# Patient Record
Sex: MALE | URBAN - METROPOLITAN AREA
[De-identification: names, ages, dates, MRNs, and addresses within clinical notes are randomized per-mention and may not be internally consistent; named-entity substitution may affect disease eponyms.]

---

## 2022-12-05 ENCOUNTER — OFFICE VISIT (OUTPATIENT)
Dept: VASCULAR SURGERY | Age: 32
End: 2022-12-05
Payer: COMMERCIAL

## 2022-12-05 VITALS
BODY MASS INDEX: 42.66 KG/M2 | DIASTOLIC BLOOD PRESSURE: 85 MMHG | HEART RATE: 86 BPM | OXYGEN SATURATION: 96 % | HEIGHT: 72 IN | WEIGHT: 315 LBS | SYSTOLIC BLOOD PRESSURE: 120 MMHG

## 2022-12-05 DIAGNOSIS — I87.2 VENOUS STASIS ULCER OF LEFT ANKLE LIMITED TO BREAKDOWN OF SKIN WITHOUT VARICOSE VEINS (HCC): Primary | ICD-10-CM

## 2022-12-05 DIAGNOSIS — I87.2 VENOUS (PERIPHERAL) INSUFFICIENCY: ICD-10-CM

## 2022-12-05 DIAGNOSIS — L97.321 VENOUS STASIS ULCER OF LEFT ANKLE LIMITED TO BREAKDOWN OF SKIN WITHOUT VARICOSE VEINS (HCC): Primary | ICD-10-CM

## 2022-12-05 DIAGNOSIS — I89.0 LYMPHEDEMA: ICD-10-CM

## 2022-12-05 PROCEDURE — 99204 OFFICE O/P NEW MOD 45 MIN: CPT | Performed by: PHYSICIAN ASSISTANT

## 2022-12-05 NOTE — PROGRESS NOTES
1. Have you been to the ER, urgent care clinic since your last visit? Yes  Hospitalized since your last visit? [facility] leg swelling     2. Have you seen or consulted any other health care providers outside of the [address] [place] [address] since your last visit? Include any pap smears or colon screening.  No

## 2022-12-06 NOTE — PROGRESS NOTES
Chief Complaint   Patient presents with    Leg Pain     Referred by [facility]          Impression:  28 y.o. male with bilateral lower extremity venous insufficiency, with left lower extremity venous stasis ulcer, and bilateral lower extremity lymphedema with abdominal component. History and Physical    Gaviota Taylor is a pleasant 28y.o. year old male who presents today for evaluation for his known chronic lower extremity swelling and fatigue tired legs. Patient states that he has developed an ulcer on the inside of his left ankle because of the persistent swelling. Patient states that the ulcer is small, dime size, and has had clear drainage over the past couple weeks. Denies any surrounding redness or tenderness to the area. He denies any fever or chills. States that he can ambulate daily about 3-4 blocks without any leg pain or discomfort but by the end of the day his legs are extremely heavy fatigued and tired. Reports that he has had varicosities since he was a young boy, states that over the past couple months he has noted that his varicosities have gotten a little more church and slightly tender, right leg greater than left. He states he is on no daily medicines at this time. Denies taking a daily aspirin or statin medication. States overall he feels he is healthy other than his lower extremity edema, presents today hoping that he can get some relief so that he can walk better. Patient denies any history of healing issues with his lower extremities in the past.    History reviewed. No pertinent past medical history. There is no problem list on file for this patient. History reviewed. No pertinent surgical history.     Allergies   Allergen Reactions    Penicillin Hives     Social History     Socioeconomic History    Marital status: UNKNOWN     Spouse name: Not on file    Number of children: Not on file    Years of education: Not on file    Highest education level: Not on file Occupational History    Not on file   Tobacco Use    Smoking status: Every Day     Packs/day: 0.50     Types: Cigarettes    Smokeless tobacco: Not on file   Substance and Sexual Activity    Alcohol use: Not on file    Drug use: Not on file    Sexual activity: Not on file   Other Topics Concern    Not on file   Social History Narrative    Not on file     Social Determinants of Health     Financial Resource Strain: Not on file   Food Insecurity: Not on file   Transportation Needs: Not on file   Physical Activity: Not on file   Stress: Not on file   Social Connections: Not on file   Intimate Partner Violence: Not on file   Housing Stability: Not on file      History reviewed. No pertinent family history. Review of Systems    General: negative for fever chills. Ambulates daily 3-4 blocks without any leg pain or discomfort but by the end the day his legs are extremely heavy fatigued and tired. Eyes: negative for vision loss   HENT: negative for cold symptoms   Respiratory negative for shortness of breath   Cardiac: negative for chest pain   Vascular negative for foot pain at night    Gastrointestinal: negative for abdominal pain   Genitourinary: negative for dysuria    Endocrine: negative for excessive thirst   Skin: Deferred dime sized ulcerated in his left medial ankle area, no drainage or surrounding erythema noted   Neurological: negative for paralysis   Psychiatric: negative for depression          Physical Exam:    Visit Vitals  /85   Pulse 86   Ht 6' (1.829 m)   Wt (!) 380 lb (172.4 kg)   SpO2 96%   BMI 51.54 kg/m²      Constitutional:  Patient is well developed, well nourished, and not distressed. Ambulated into the room albeit slowly but with a normal steady gait. HEENT: atraumatic, normocephalic, wearing a mask. Eyes:   Cunjunctivae clear, no scleral icterus  Neck:   No JVD present. Cardiovascular:  Normal rate, regular rhythm, normal heart sounds. No murmur heard.   Pulmonary/Chest: Effort normal .  Extremities: Normal range of motion. Positive bilateral pedal edema 1+. Left greater than right. Small dime size ulcerative area noted, to the left lower extremity, with only skin breakage noted, no surrounding erythema no purulent drainage. Chronic hemosiderin staining noted bilaterally. No calf tenderness to palpation bilaterally. Neurovascularly intact to both soft and deep sensation bilaterally. Albeit slightly diminished due to edema palpable pedal pulses noted bilaterally. Neurological:  he  is alert and oriented x3 . Gait normal. Motor & sensory grossly intact in all 4 limbs. Psych: Appropriate mood and affect. Skin:  Skin is warm and dry. No ulcerations  Pulses:  Femoral-palpable bilaterally                Popliteal-palpable bilaterally                PT/DP -albeit diminished palpable bilaterally, triphasic Doppler signal noted  Carotid: No carotid bruits or thrills appreciated bilaterally. Plan:  Patient instructed on the difference between arterial and venous insufficiency. Given the fact the patient has palpable pedal pulses and no signs of claudication we will continue with venous insufficiency work-up. Patient instructed on the importance of compression elevation in the face of venous insufficiency. Patient educated on proper elevation, elevation above the level of his heart. Patient instructed he must lay flat with 2-3 pillows under his legs to get his legs 20 to 30 inches above the level of his heart for proper elevation. Patient agreeable to try. Long discussion with patient about compression therapy. Patient given a prescription for compression stockings 20 to 30 mmHg and compliance discussed at length.   Being that the patient has a small ulcerated area on his left ankle we will refrain from compression therapy at this visit we placed the patient with a aqua seal dressing, wrap the incisional area with Kerlix, and applied Tubi compression gauze to both lower extremities. Educated on local wound care, patient to continue with daily dressing changes with aqua seal and 4 x 4's and Kerlix, patient instructed on proper treatment and states that he is willing to proceed as planned. Patient states that he feels much better with the Tubi compression stockings on. Brief discussion held with patient about venous reflux disease and ablation therapy for such disease. Patient instructed if he was to be compliant with compression elevation I will bring him back in 3 months and perform bilateral lower extremity venous reflux imaging. Patient states he understands more than willing to proceed as planned. Patient to follow-up sooner if he has any worsening of his ulcerative area, in the meantime he is to continue with local treatment and compression elevation. Brief discussion held with patient about lymphedema in the face of venous insufficiency. Patient instructed that once he gets his ulceration healed up and get his legs with less edema we will discuss lymphedema compression treatment as a modality in the future. Patient agreeable. In the meantime patient encouraged to make better lifestyle choices, better nutritional choices, weight loss, increase his daily activity as tolerated, being compliant with compression elevation, and of course smoking cessation. Patient states he understands above, is more than willing to be compliant with above recommendations, and is willing to proceed as planned. I will discuss details with my attending. The treatment plan was reviewed with the patient in detail. The patient voiced understanding of this plan and all questions and concerns were addressed. The patient agrees with this plan. We discussed the signs and symptoms that would require earlier attention or intervention. I appreciate the opportunity to participate in the care of your patient.   I will be sure to keep you informed of any subsequent changes in the treatment plan.  If you have any questions or concerns, please feel free to contact me.       iPotr Burleson PA-C  Vascular Physician Assistant  [phone]

## 2023-02-08 DIAGNOSIS — I87.2 VENOUS INSUFFICIENCY (CHRONIC) (PERIPHERAL): Primary | ICD-10-CM

## 2023-03-06 DIAGNOSIS — I87.2 VENOUS (PERIPHERAL) INSUFFICIENCY: ICD-10-CM

## 2023-03-06 DIAGNOSIS — L97.321 VENOUS STASIS ULCER OF LEFT ANKLE LIMITED TO BREAKDOWN OF SKIN WITHOUT VARICOSE VEINS (HCC): ICD-10-CM

## 2023-03-06 DIAGNOSIS — I87.2 VENOUS STASIS ULCER OF LEFT ANKLE LIMITED TO BREAKDOWN OF SKIN WITHOUT VARICOSE VEINS (HCC): ICD-10-CM

## 2023-03-06 DIAGNOSIS — I89.0 LYMPHEDEMA: ICD-10-CM

## 2023-03-19 NOTE — PROGRESS NOTES
superficial venous insufficiency. Interpretation Summary         No evidence of deep vein thrombosis in the left lower extremity. Grossly patent tibial and peroneal veins with color and doppler, cannot exclude non occlusive thrombus. Technically difficult exam secondary to edema. Deep venous insufficiency (>1.0 sec) noted in the left common femoral vein. Superficial venous insufficiency (>0.5 sec) noted in the left great saphenous vein. Incompetent  vein in the mid to distal calf with multiple varices associated. The left SSV is patent without reflux. Non vascular mass in the left groin measuring 2.9 x 1.5cm trv consistent with a lymph node. Procedure Details    A gray scale, color Doppler imaging and spectral Doppler analysis ultrasound was performed. During the study longitudinal and transverse views were obtained. Pulsed wave doppler was performed. Overall the study quality was technically difficult. Study was technically difficult due to: body habitus and diffuse subcutaneous edema. Lower Extremity Venous Findings    Right Lower Venous    For comparison purposes, the right common femoral vein was briefly interrogated. The vein demonstrates normal color filling and compressibility. Doppler flow was phasic and spontaneous. Left Lower Venous    No evidence of deep vein thrombosis. The common femoral, saphenofemoral junction, profunda, femoral, popliteal, posterior tibial, and peroneal veins were all imaged in the transverse view with normal compressibility and in the sagittal view with spontaneous, phasic flow, and normal color filling. Posterior Tibial Vein: Grossly patent (cannot exclude non-occlusive thrombus). Peroneal Vein: Grossly patent (cannot exclude non-occlusive thrombus). Posterior tibial artery Doppler waveforms are multiphasic. Reflux study patient position: supine. Deep venous insufficiency (>1.0 sec) noted in the common femoral vein.

## 2023-03-20 ENCOUNTER — OFFICE VISIT (OUTPATIENT)
Age: 33
End: 2023-03-20
Payer: COMMERCIAL

## 2023-03-20 VITALS
HEIGHT: 72 IN | WEIGHT: 315 LBS | BODY MASS INDEX: 42.66 KG/M2 | DIASTOLIC BLOOD PRESSURE: 70 MMHG | SYSTOLIC BLOOD PRESSURE: 130 MMHG | OXYGEN SATURATION: 96 % | HEART RATE: 82 BPM

## 2023-03-20 DIAGNOSIS — I89.0 LYMPHEDEMA: ICD-10-CM

## 2023-03-20 DIAGNOSIS — I83.893 VARICOSE VEINS OF BOTH LEGS WITH EDEMA: ICD-10-CM

## 2023-03-20 DIAGNOSIS — E66.01 MORBID OBESITY WITH BODY MASS INDEX OF 50.0-59.9 IN ADULT (HCC): Primary | ICD-10-CM

## 2023-03-20 PROCEDURE — 99214 OFFICE O/P EST MOD 30 MIN: CPT | Performed by: SURGERY

## 2023-05-03 ENCOUNTER — OFFICE VISIT (OUTPATIENT)
Age: 33
End: 2023-05-03
Payer: COMMERCIAL

## 2023-05-03 VITALS
SYSTOLIC BLOOD PRESSURE: 132 MMHG | DIASTOLIC BLOOD PRESSURE: 80 MMHG | BODY MASS INDEX: 42.66 KG/M2 | WEIGHT: 315 LBS | HEART RATE: 77 BPM | OXYGEN SATURATION: 97 % | HEIGHT: 72 IN

## 2023-05-03 DIAGNOSIS — I83.893 VARICOSE VEINS OF BOTH LEGS WITH EDEMA: Primary | ICD-10-CM

## 2023-05-03 DIAGNOSIS — I89.0 LYMPHEDEMA: ICD-10-CM

## 2023-05-03 DIAGNOSIS — E66.01 MORBID OBESITY WITH BODY MASS INDEX OF 50.0-59.9 IN ADULT (HCC): Primary | ICD-10-CM

## 2023-05-03 PROCEDURE — 99213 OFFICE O/P EST LOW 20 MIN: CPT | Performed by: SURGERY

## 2023-05-03 RX ORDER — NAPROXEN 500 MG/1
500 TABLET ORAL 2 TIMES DAILY
COMMUNITY
Start: 2023-04-25

## 2023-05-03 ASSESSMENT — PATIENT HEALTH QUESTIONNAIRE - PHQ9
SUM OF ALL RESPONSES TO PHQ QUESTIONS 1-9: 0
2. FEELING DOWN, DEPRESSED OR HOPELESS: 0
SUM OF ALL RESPONSES TO PHQ9 QUESTIONS 1 & 2: 0
SUM OF ALL RESPONSES TO PHQ QUESTIONS 1-9: 0
SUM OF ALL RESPONSES TO PHQ QUESTIONS 1-9: 0
1. LITTLE INTEREST OR PLEASURE IN DOING THINGS: 0
SUM OF ALL RESPONSES TO PHQ QUESTIONS 1-9: 0
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
## 2023-05-03 NOTE — PROGRESS NOTES
1. Have you been to the ER, urgent care clinic since your last visit? Yes/ [facility]/ april     Hospitalized since your last visit? No     2. Have you seen or consulted any other health care providers outside of the [address] [place] [address] since your last visit? Include any pap smears or colon screening.   No

## 2023-07-10 ENCOUNTER — OFFICE VISIT (OUTPATIENT)
Facility: CLINIC | Age: 33
End: 2023-07-10
Payer: COMMERCIAL

## 2023-07-10 ENCOUNTER — HOSPITAL ENCOUNTER (OUTPATIENT)
Facility: HOSPITAL | Age: 33
Setting detail: SPECIMEN
Discharge: HOME | End: 2023-07-13
Payer: COMMERCIAL

## 2023-07-10 VITALS
HEIGHT: 72 IN | HEART RATE: 71 BPM | DIASTOLIC BLOOD PRESSURE: 72 MMHG | BODY MASS INDEX: 42.66 KG/M2 | OXYGEN SATURATION: 94 % | TEMPERATURE: 98.5 F | RESPIRATION RATE: 16 BRPM | WEIGHT: 315 LBS | SYSTOLIC BLOOD PRESSURE: 142 MMHG

## 2023-07-10 DIAGNOSIS — E66.01 CLASS 3 SEVERE OBESITY DUE TO EXCESS CALORIES WITH SERIOUS COMORBIDITY AND BODY MASS INDEX (BMI) OF 50.0 TO 59.9 IN ADULT (HCC): ICD-10-CM

## 2023-07-10 DIAGNOSIS — I10 ESSENTIAL HYPERTENSION: ICD-10-CM

## 2023-07-10 DIAGNOSIS — G47.9 DISORDERED SLEEP: Primary | ICD-10-CM

## 2023-07-10 DIAGNOSIS — L73.2 HIDRADENITIS SUPPURATIVA: ICD-10-CM

## 2023-07-10 DIAGNOSIS — I89.0 LYMPHEDEMA: ICD-10-CM

## 2023-07-10 LAB
ALBUMIN SERPL-MCNC: 3.3 G/DL (ref 3.4–5)
ALBUMIN/GLOB SERPL: 0.9 (ref 0.8–1.7)
ALP SERPL-CCNC: 131 U/L (ref 45–117)
ALT SERPL-CCNC: 51 U/L (ref 16–61)
ANION GAP SERPL CALC-SCNC: 2 MMOL/L (ref 3–18)
AST SERPL-CCNC: 17 U/L (ref 10–38)
BILIRUB SERPL-MCNC: 0.3 MG/DL (ref 0.2–1)
BUN SERPL-MCNC: 9 MG/DL (ref 7–18)
BUN/CREAT SERPL: 12 (ref 12–20)
CALCIUM SERPL-MCNC: 8.8 MG/DL (ref 8.5–10.1)
CHLORIDE SERPL-SCNC: 109 MMOL/L (ref 100–111)
CHOLEST SERPL-MCNC: 175 MG/DL
CO2 SERPL-SCNC: 29 MMOL/L (ref 21–32)
CREAT SERPL-MCNC: 0.74 MG/DL (ref 0.6–1.3)
ERYTHROCYTE [DISTWIDTH] IN BLOOD BY AUTOMATED COUNT: 14.3 % (ref 11.6–14.5)
EST. AVERAGE GLUCOSE BLD GHB EST-MCNC: 143 MG/DL
GLOBULIN SER CALC-MCNC: 3.6 G/DL (ref 2–4)
GLUCOSE SERPL-MCNC: 138 MG/DL (ref 74–99)
HBA1C MFR BLD: 6.6 % (ref 4.2–5.6)
HCT VFR BLD AUTO: 44.9 % (ref 36–48)
HDLC SERPL-MCNC: 25 MG/DL (ref 40–60)
HDLC SERPL: 7 (ref 0–5)
HGB BLD-MCNC: 14.4 G/DL (ref 13–16)
LDLC SERPL CALC-MCNC: 73.6 MG/DL (ref 0–100)
LIPID PANEL: ABNORMAL
MCH RBC QN AUTO: 29.2 PG (ref 24–34)
MCHC RBC AUTO-ENTMCNC: 32.1 G/DL (ref 31–37)
MCV RBC AUTO: 91.1 FL (ref 78–100)
NRBC # BLD: 0 K/UL (ref 0–0.01)
NRBC BLD-RTO: 0 PER 100 WBC
PLATELET # BLD AUTO: 364 K/UL (ref 135–420)
PMV BLD AUTO: 11 FL (ref 9.2–11.8)
POTASSIUM SERPL-SCNC: 4.4 MMOL/L (ref 3.5–5.5)
PROT SERPL-MCNC: 6.9 G/DL (ref 6.4–8.2)
RBC # BLD AUTO: 4.93 M/UL (ref 4.35–5.65)
SODIUM SERPL-SCNC: 140 MMOL/L (ref 136–145)
TRIGL SERPL-MCNC: 382 MG/DL
VLDLC SERPL CALC-MCNC: 76.4 MG/DL
WBC # BLD AUTO: 11.6 K/UL (ref 4.6–13.2)

## 2023-07-10 PROCEDURE — 99204 OFFICE O/P NEW MOD 45 MIN: CPT | Performed by: STUDENT IN AN ORGANIZED HEALTH CARE EDUCATION/TRAINING PROGRAM

## 2023-07-10 PROCEDURE — 3078F DIAST BP <80 MM HG: CPT | Performed by: STUDENT IN AN ORGANIZED HEALTH CARE EDUCATION/TRAINING PROGRAM

## 2023-07-10 PROCEDURE — 3077F SYST BP >= 140 MM HG: CPT | Performed by: STUDENT IN AN ORGANIZED HEALTH CARE EDUCATION/TRAINING PROGRAM

## 2023-07-10 PROCEDURE — 83036 HEMOGLOBIN GLYCOSYLATED A1C: CPT

## 2023-07-10 PROCEDURE — 80053 COMPREHEN METABOLIC PANEL: CPT

## 2023-07-10 PROCEDURE — 80061 LIPID PANEL: CPT

## 2023-07-10 PROCEDURE — 36415 COLL VENOUS BLD VENIPUNCTURE: CPT

## 2023-07-10 PROCEDURE — 85027 COMPLETE CBC AUTOMATED: CPT

## 2023-07-10 RX ORDER — LISINOPRIL 20 MG/1
20 TABLET ORAL DAILY
Qty: 90 TABLET | Refills: 1 | Status: SHIPPED | OUTPATIENT
Start: 2023-07-10

## 2023-07-10 SDOH — ECONOMIC STABILITY: FOOD INSECURITY: WITHIN THE PAST 12 MONTHS, THE FOOD YOU BOUGHT JUST DIDN'T LAST AND YOU DIDN'T HAVE MONEY TO GET MORE.: NEVER TRUE

## 2023-07-10 SDOH — ECONOMIC STABILITY: HOUSING INSECURITY
IN THE LAST 12 MONTHS, WAS THERE A TIME WHEN YOU DID NOT HAVE A STEADY PLACE TO SLEEP OR SLEPT IN A SHELTER (INCLUDING NOW)?: NO

## 2023-07-10 SDOH — ECONOMIC STABILITY: INCOME INSECURITY: HOW HARD IS IT FOR YOU TO PAY FOR THE VERY BASICS LIKE FOOD, HOUSING, MEDICAL CARE, AND HEATING?: SOMEWHAT HARD

## 2023-07-10 SDOH — ECONOMIC STABILITY: FOOD INSECURITY: WITHIN THE PAST 12 MONTHS, YOU WORRIED THAT YOUR FOOD WOULD RUN OUT BEFORE YOU GOT MONEY TO BUY MORE.: SOMETIMES TRUE

## 2023-07-10 ASSESSMENT — PATIENT HEALTH QUESTIONNAIRE - PHQ9
SUM OF ALL RESPONSES TO PHQ QUESTIONS 1-9: 13
1. LITTLE INTEREST OR PLEASURE IN DOING THINGS: 1
10. IF YOU CHECKED OFF ANY PROBLEMS, HOW DIFFICULT HAVE THESE PROBLEMS MADE IT FOR YOU TO DO YOUR WORK, TAKE CARE OF THINGS AT HOME, OR GET ALONG WITH OTHER PEOPLE: 1
SUM OF ALL RESPONSES TO PHQ9 QUESTIONS 1 & 2: 2
8. MOVING OR SPEAKING SO SLOWLY THAT OTHER PEOPLE COULD HAVE NOTICED. OR THE OPPOSITE, BEING SO FIGETY OR RESTLESS THAT YOU HAVE BEEN MOVING AROUND A LOT MORE THAN USUAL: 1
SUM OF ALL RESPONSES TO PHQ QUESTIONS 1-9: 13
SUM OF ALL RESPONSES TO PHQ QUESTIONS 1-9: 13
3. TROUBLE FALLING OR STAYING ASLEEP: 2
2. FEELING DOWN, DEPRESSED OR HOPELESS: 1
7. TROUBLE CONCENTRATING ON THINGS, SUCH AS READING THE NEWSPAPER OR WATCHING TELEVISION: 1
5. POOR APPETITE OR OVEREATING: 2
9. THOUGHTS THAT YOU WOULD BE BETTER OFF DEAD, OR OF HURTING YOURSELF: 0
4. FEELING TIRED OR HAVING LITTLE ENERGY: 2
SUM OF ALL RESPONSES TO PHQ QUESTIONS 1-9: 13
6. FEELING BAD ABOUT YOURSELF - OR THAT YOU ARE A FAILURE OR HAVE LET YOURSELF OR YOUR FAMILY DOWN: 3

## 2023-07-10 NOTE — PROGRESS NOTES
Alexander Hoang (: 1990) is a 28 y.o. male, new patient, here for evaluation of the following chief complaint(s):  Establish Care (Sleep apnea, referral to a podiatrist/)       Assessment/Plan:  1. Disordered sleep-witnessed apneic episodes, daytime sleepiness, loud snoring. Referral to sleep medicine for sleep study. Suspect blood pressure will improve with treatment of sleep apnea. -     [address], [address], , Sleep Medicine, [facility])  2. Essential hypertension-longstanding elevation, likely over the last 10 years. We will start lisinopril 20 mg daily. Recommend ambulatory blood pressure checks as able. -     lisinopril (PRINIVIL;ZESTRIL) 20 MG tablet; Take 1 tablet by mouth daily, Disp-90 tablet, R-1Normal  3. Class 3 severe obesity due to excess calories with serious comorbidity and body mass index (BMI) of 50.0 to 59.9 in adult (HCC)-diet and lifestyle modifications for weight loss. Dietary principles from MyPlate.gov for a healthy, varied diet. Reduce caloric intake. Recommend cumulatively 150-minutes of moderate intensity exercise or 75-minutes of vigorous activity weekly.  -     [facility] [address], , Sleep Medicine, [facility])  -     Lipid Panel; Future  -     Comprehensive Metabolic Panel; Future  -     CBC; Future  -     Hemoglobin A1C; Future  4. Lymphedema-continue follow-up with vascular surgery. Lymphedema pump further recommendations. Referral to podiatry for further foot care. -     External Referral To Podiatry  5. Hidradenitis suppurativa-discussed the importance of tobacco cessation and weight loss for the treatment of this condition. Can treat lesions as needed for the time being. Return in about 2 months (around 9/10/2023) for blood pressure, tobacco and weight. Subjective/Objective:  HPI  Establish Care- Recent move from [place]. Wife family is in the [place] area.  Currently working at [employer], previously a [employer]
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
## 2023-07-24 ENCOUNTER — OFFICE VISIT (OUTPATIENT)
Facility: CLINIC | Age: 33
End: 2023-07-24
Payer: COMMERCIAL

## 2023-07-24 VITALS
OXYGEN SATURATION: 97 % | BODY MASS INDEX: 50.86 KG/M2 | DIASTOLIC BLOOD PRESSURE: 82 MMHG | TEMPERATURE: 97.9 F | WEIGHT: 315 LBS | SYSTOLIC BLOOD PRESSURE: 132 MMHG | RESPIRATION RATE: 16 BRPM | HEART RATE: 79 BPM

## 2023-07-24 DIAGNOSIS — E66.01 CLASS 3 SEVERE OBESITY DUE TO EXCESS CALORIES WITH SERIOUS COMORBIDITY AND BODY MASS INDEX (BMI) OF 50.0 TO 59.9 IN ADULT (HCC): ICD-10-CM

## 2023-07-24 DIAGNOSIS — F17.200 TOBACCO USE DISORDER: ICD-10-CM

## 2023-07-24 DIAGNOSIS — E11.9 TYPE 2 DIABETES MELLITUS WITHOUT COMPLICATION, WITHOUT LONG-TERM CURRENT USE OF INSULIN (HCC): Primary | ICD-10-CM

## 2023-07-24 PROCEDURE — 3079F DIAST BP 80-89 MM HG: CPT | Performed by: STUDENT IN AN ORGANIZED HEALTH CARE EDUCATION/TRAINING PROGRAM

## 2023-07-24 PROCEDURE — 3075F SYST BP GE 130 - 139MM HG: CPT | Performed by: STUDENT IN AN ORGANIZED HEALTH CARE EDUCATION/TRAINING PROGRAM

## 2023-07-24 PROCEDURE — 3044F HG A1C LEVEL LT 7.0%: CPT | Performed by: STUDENT IN AN ORGANIZED HEALTH CARE EDUCATION/TRAINING PROGRAM

## 2023-07-24 PROCEDURE — 99214 OFFICE O/P EST MOD 30 MIN: CPT | Performed by: STUDENT IN AN ORGANIZED HEALTH CARE EDUCATION/TRAINING PROGRAM

## 2023-07-24 RX ORDER — TIRZEPATIDE 2.5 MG/.5ML
2.5 INJECTION, SOLUTION SUBCUTANEOUS WEEKLY
Qty: 4 ADJUSTABLE DOSE PRE-FILLED PEN SYRINGE | Refills: 0 | Status: SHIPPED | OUTPATIENT
Start: 2023-07-24

## 2023-07-24 RX ORDER — TIRZEPATIDE 7.5 MG/.5ML
7.5 INJECTION, SOLUTION SUBCUTANEOUS WEEKLY
Qty: 4 ADJUSTABLE DOSE PRE-FILLED PEN SYRINGE | Refills: 0 | Status: SHIPPED | OUTPATIENT
Start: 2023-09-12

## 2023-07-24 RX ORDER — TIRZEPATIDE 5 MG/.5ML
5 INJECTION, SOLUTION SUBCUTANEOUS WEEKLY
Qty: 4 ADJUSTABLE DOSE PRE-FILLED PEN SYRINGE | Refills: 0 | Status: SHIPPED | OUTPATIENT
Start: 2023-07-24